# Patient Record
Sex: FEMALE | Race: WHITE | NOT HISPANIC OR LATINO | URBAN - METROPOLITAN AREA
[De-identification: names, ages, dates, MRNs, and addresses within clinical notes are randomized per-mention and may not be internally consistent; named-entity substitution may affect disease eponyms.]

---

## 2017-11-07 ENCOUNTER — IMPORTED ENCOUNTER (OUTPATIENT)
Dept: URBAN - METROPOLITAN AREA CLINIC 50 | Facility: CLINIC | Age: 42
End: 2017-11-07

## 2018-11-13 ENCOUNTER — IMPORTED ENCOUNTER (OUTPATIENT)
Dept: URBAN - METROPOLITAN AREA CLINIC 50 | Facility: CLINIC | Age: 43
End: 2018-11-13

## 2019-01-22 NOTE — PATIENT DISCUSSION
New Prescription: Acular (ketorolac): drops: 0.5% 1 drop four times a day as directed into affected eye 01-

## 2019-01-22 NOTE — PATIENT DISCUSSION
New Prescription: Ocuflox (ofloxacin): drops: 0.3% 1 drop four times a day as directed into affected eye 01-

## 2019-01-22 NOTE — PATIENT DISCUSSION
New Prescription: Pred Forte (prednisolone acetate): drops,suspension: 1% 1 drop four times a day into left eye 01-

## 2019-01-31 NOTE — PATIENT DISCUSSION
Continue: Ocuflox (ofloxacin): drops: 0.3% 1 drop four times a day as directed into affected eye 01-

## 2019-01-31 NOTE — PATIENT DISCUSSION
Continue: Pred Forte (prednisolone acetate): drops,suspension: 1% 1 drop four times a day as directed into left eye 01-

## 2019-02-07 NOTE — PATIENT DISCUSSION
Stopped Today: Ocuflox (ofloxacin): drops: 0.3% 1 drop four times a day as directed into affected eye 01-

## 2019-02-07 NOTE — PATIENT DISCUSSION
Stopped Today: Acular (ketorolac): drops: 0.5% 1 drop four times a day as directed into affected eye 01-

## 2019-07-30 NOTE — PATIENT DISCUSSION
Cataract :Surgery Counseling: I have discussed the option of scheduling cataract surgery versus following, as well as the risks, benefits and alternatives of surgery with the patient. It was explained that the surgery is elective, there is no rush and there is no harm in waiting to have surgery. It was also explained that there is no guarantee that removing the cataract will improve their vision. The patient understands and desires to proceed with cataract surgery with implantation of an intraocular lens  to improve vision. Suturegard Intro: Intraoperative tissue expansion was performed, utilizing the SUTUREGARD device, in order to reduce wound tension.

## 2019-11-12 ENCOUNTER — IMPORTED ENCOUNTER (OUTPATIENT)
Dept: URBAN - METROPOLITAN AREA CLINIC 50 | Facility: CLINIC | Age: 44
End: 2019-11-12

## 2020-11-17 ENCOUNTER — IMPORTED ENCOUNTER (OUTPATIENT)
Dept: URBAN - METROPOLITAN AREA CLINIC 50 | Facility: CLINIC | Age: 45
End: 2020-11-17

## 2021-04-17 ASSESSMENT — VISUAL ACUITY
OS_OTHER: 20/20. >20/400.
OS_CC: J1+
OS_BAT: 20/20
OD_CC: J1+
OS_CC: J1+@ 23 IN
OD_CC: 20/20
OD_PH: @ 16 IN
OS_CC: J2
OS_CC: 20/15-1
OD_BAT: 20/20
OD_CC: J1+@ 16 IN
OD_OTHER: 20/50. 20/60.
OS_CC: 20/20
OS_BAT: 20/30
OD_OTHER: 20/20. 20/30.
OS_PH: @ 16 IN
OD_CC: J2
OS_OTHER: 20/30. 20/40.
OS_CC: 20/20
OD_CC: J1+@ 23 IN
OS_CC: J1+@ 16 IN
OD_BAT: 20/50
OS_CC: 20/20
OD_CC: 20/15-1

## 2021-04-17 ASSESSMENT — TONOMETRY
OD_IOP_MMHG: 18
OD_IOP_MMHG: 14
OS_IOP_MMHG: 14
OD_IOP_MMHG: 19
OD_IOP_MMHG: 16
OS_IOP_MMHG: 19
OS_IOP_MMHG: 18
OS_IOP_MMHG: 18

## 2021-11-16 ENCOUNTER — ANNUAL COMPREHENSIVE EXAM (OUTPATIENT)
Dept: URBAN - METROPOLITAN AREA CLINIC 50 | Facility: CLINIC | Age: 46
End: 2021-11-16

## 2021-11-16 DIAGNOSIS — H25.13: ICD-10-CM

## 2021-11-16 DIAGNOSIS — H04.123: ICD-10-CM

## 2021-11-16 DIAGNOSIS — H52.4: ICD-10-CM

## 2021-11-16 DIAGNOSIS — E11.9: ICD-10-CM

## 2021-11-16 PROCEDURE — 92014 COMPRE OPH EXAM EST PT 1/>: CPT

## 2021-11-16 PROCEDURE — 92015 DETERMINE REFRACTIVE STATE: CPT

## 2021-11-16 ASSESSMENT — TONOMETRY
OS_IOP_MMHG: 18
OD_IOP_MMHG: 16

## 2021-11-16 ASSESSMENT — VISUAL ACUITY
OU_SC: J1@16"
OD_SC: 20/20-1
OS_SC: 20/20-1

## 2022-03-16 NOTE — PATIENT DISCUSSION
"""Annual Type 2 Diabetic eye exam with dilation. No diabetic retinopathy found. Recommend annual dilated examinations. Patient instructed to call office immediately if sudden changes in vision occur. Emphasized importance of good blood glucose control. Summary of care provided to the physician managing the ongoing diabetes care. """ Kumar White arrives to room with complaint of cough congestion symptoms started 1 days ago.

## 2022-09-27 NOTE — PATIENT DISCUSSION
[facility] monitoring, AREDS2 vitamins, no smoking, green leafy vegetables discussed.

## 2022-09-27 NOTE — PATIENT DISCUSSION
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 The resulting symptoms, including but not limited to visual field deficits, blepharitis, and/or dry eye symptoms from eyelid wick syndrome, that may result if left untreated were discussed with the patient. The patient understands that intervention is elective surgery which can be scheduled at their convenience.

## 2022-11-22 ENCOUNTER — COMPREHENSIVE EXAM (OUTPATIENT)
Dept: URBAN - METROPOLITAN AREA CLINIC 50 | Facility: CLINIC | Age: 47
End: 2022-11-22

## 2022-11-22 DIAGNOSIS — H25.13: ICD-10-CM

## 2022-11-22 DIAGNOSIS — H04.123: ICD-10-CM

## 2022-11-22 DIAGNOSIS — E11.9: ICD-10-CM

## 2022-11-22 PROCEDURE — 92014 COMPRE OPH EXAM EST PT 1/>: CPT

## 2022-11-22 ASSESSMENT — VISUAL ACUITY
OD_SC: 20/20
OS_SC: 20/20-2
OU_CC: J1+ @ 14"
OS_GLARE: 20/60
OS_GLARE: 20/40
OD_GLARE: 20/30
OD_GLARE: 20/50

## 2022-11-22 ASSESSMENT — TONOMETRY
OS_IOP_MMHG: 18
OD_IOP_MMHG: 18

## 2023-11-14 ENCOUNTER — COMPREHENSIVE EXAM (OUTPATIENT)
Dept: URBAN - METROPOLITAN AREA CLINIC 50 | Facility: LOCATION | Age: 48
End: 2023-11-14

## 2023-11-14 DIAGNOSIS — H25.13: ICD-10-CM

## 2023-11-14 DIAGNOSIS — E11.9: ICD-10-CM

## 2023-11-14 PROCEDURE — 92014 COMPRE OPH EXAM EST PT 1/>: CPT

## 2023-11-14 ASSESSMENT — VISUAL ACUITY
OS_GLARE: 20/25
OS_GLARE: 20/25
OD_SC: 20/25-1
OD_GLARE: 20/25
OS_SC: 20/20
OU_CC: J1+ (-)
OD_GLARE: 20/25

## 2023-11-14 ASSESSMENT — TONOMETRY
OD_IOP_MMHG: 12
OS_IOP_MMHG: 12

## 2024-11-19 ENCOUNTER — COMPREHENSIVE EXAM (OUTPATIENT)
Dept: URBAN - METROPOLITAN AREA CLINIC 50 | Facility: LOCATION | Age: 49
End: 2024-11-19

## 2024-11-19 DIAGNOSIS — H25.13: ICD-10-CM

## 2024-11-19 DIAGNOSIS — H04.123: ICD-10-CM

## 2024-11-19 DIAGNOSIS — H52.4: ICD-10-CM

## 2024-11-19 DIAGNOSIS — E11.9: ICD-10-CM

## 2024-11-19 PROCEDURE — 99214 OFFICE O/P EST MOD 30 MIN: CPT

## 2024-11-19 PROCEDURE — 92015 DETERMINE REFRACTIVE STATE: CPT
